# Patient Record
Sex: FEMALE
[De-identification: names, ages, dates, MRNs, and addresses within clinical notes are randomized per-mention and may not be internally consistent; named-entity substitution may affect disease eponyms.]

---

## 2022-08-17 ENCOUNTER — HOSPITAL ENCOUNTER (OUTPATIENT)
Dept: HOSPITAL 75 - RAD | Age: 22
End: 2022-08-17
Attending: NURSE PRACTITIONER
Payer: COMMERCIAL

## 2022-08-17 DIAGNOSIS — R76.11: Primary | ICD-10-CM

## 2022-08-17 PROCEDURE — 71045 X-RAY EXAM CHEST 1 VIEW: CPT

## 2022-08-17 NOTE — DIAGNOSTIC IMAGING REPORT
INDICATION: Positive TB skin test.



EXAMINATION: Single view chest, 08/17/2022.



FINDINGS: The cardiomediastinal silhouette is unremarkable. The

pulmonary vasculature is within normal limits.



The lungs and pleural spaces are clear.



IMPRESSION: No evidence of an acute cardiopulmonary process. 



Dictated by: 



  Dictated on workstation # [id number]

## 2023-01-13 ENCOUNTER — HOSPITAL ENCOUNTER (EMERGENCY)
Dept: HOSPITAL 75 - ER | Age: 23
Discharge: HOME | End: 2023-01-13
Payer: SELF-PAY

## 2023-01-13 VITALS — BODY MASS INDEX: 21.64 KG/M2 | HEIGHT: 59.84 IN | WEIGHT: 110.23 LBS

## 2023-01-13 VITALS — SYSTOLIC BLOOD PRESSURE: 106 MMHG | DIASTOLIC BLOOD PRESSURE: 85 MMHG

## 2023-01-13 DIAGNOSIS — J02.9: Primary | ICD-10-CM

## 2023-01-13 PROCEDURE — 99283 EMERGENCY DEPT VISIT LOW MDM: CPT

## 2023-01-13 PROCEDURE — 87430 STREP A AG IA: CPT

## 2023-01-13 NOTE — ED EENT
History of Present Illness


General


Chief Complaint:  Oral/Throat Problems


Stated Complaint:  THROAT PAIN


Nursing Triage Note:  


sore throat, subjective fever today.


Source:  patient





History of Present Illness


Date Seen by Provider:  Jan 13, 2023


Time Seen by Provider:  22:40


Initial Comments


PT ARRIVES VIA POV


C/O SORE THROAT THAT BEGAN THIS AFTERNOON


C/O SUBJECTIVE FEVER


C/O FRONTAL HEADACHE


NO URI SYMPTOMS OR COUGH


NO BODY ACHES


NO GI SYMPTOMS





HAS A FRIEND WHO HAS BEEN SICK WITH THE SAME FOR THE LAST 2-3 DAYS, AND SHE HAS 

BEEN WITH HIM THE LAST 2 DAYS. HE HAS NOT SEEN  





TOOK UNKNOWN MEDICATION THIS AFTERNOON FOR SYMPTOMS





NO CHRONIC MEDICAL PROBLEMS





PT IS [facility]





Allergies and Home Medications


Allergies


Coded Allergies:  


     No Known Drug Allergies (Unverified , 1/13/23)





Patient Home Medication List


Home Medication List Reviewed:  Yes


Amoxicillin (Amoxicillin) 875 Mg Tablet, 875 MG PO BID


   Prescribed by: LUIS BALLARD on 1/13/23 4929





Review of Systems


Review of Systems


Constitutional:  see HPI


Eyes:  No Symptoms Reported


Ears:  No Symptoms Reported


Nose:  no symptoms reported


Mouth:  no symptoms reported


Throat:  see HPI, pain; denies hoarse, denies muffled; painful swallowing; 

denies difficulty with fluids


Respiratory:  no symptoms reported


Cardiovascular:  no symptoms reported


Gastrointestinal:  no symptoms reported


Pregnant:  No


Musculoskeletal:  no symptoms reported


Skin:  no symptoms reported


Neurological:  See HPI, Headache


Hematologic/Lymphatic:  No Symptoms Reported


Immunological/Allergic:  no symptoms reported





Past Medical-Social-Family Hx


Patient Social History


Tobacco Use?:  No


Substance use?:  No


Alcohol Use?:  No


Pt feels they are or have been:  No





Immunizations Up To Date


First/Initial COVID19 Vaccinat:  x2





Past Medical History


Surgery/Hospitalization HX:  


tonsillitis


Surgeries:  No


Respiratory:  No


Cardiac:  No


Neurological:  No


Genitourinary:  No


Gastrointestinal:  No


Musculoskeletal:  No


Endocrine:  No


HEENT:  No


Cancer:  No


Psychosocial:  No


Integumentary:  No


Blood Disorders:  No





Physical Exam


Vital Signs





Vital Signs - First Documented








 1/13/23





 22:34


 


Temp 35.9


 


Pulse 80


 


Resp 16


 


B/P (MAP) 106/85 (92)


 


Pulse Ox 100


 


O2 Delivery Room Air








Height, Weight, BMI


Height: '"


Weight: lbs. oz. kg; 21.00 BMI


Method:


General Appearance:  WD/WN, no apparent distress, other (DOES NOT APPEAR ILL OR 

TO BE IN ANY DISCOMFORT OR DISTRESS. TALKATIVE, SMILING. )


Eyes:  bilateral eye normal inspection


Ears:  bilateral ear auricle normal, bilateral ear canal normal, bilateral ear 

TM normal


Nose:  normal inspection


Mouth/Throat:  No trismus, No uvula swelling, No voice changes; other (ERYTHEMA 

TO TONSILS AND UVULA. TONSILS NOT SIGNIFICANTLY SWOLLEN. NO EVIDENCE OF ABSCESS.

NO DIFFICULTY HANDLING SECRETIONS. NO EXUDATES NOTED. )


Neck:  non-tender, full range of motion, supple, normal inspection; No 

lymphadenopathy (R), No lymphadenopathy (L)


Cardiovascular:  regular rate, rhythm, no murmur


Respiratory:  normal breath sounds, no respiratory distress, no accessory muscle

use


Gastrointestinal:  non tender, soft


Neurologic/Psychiatric:  CNs II-XII nml as tested, no motor/sensory deficits, 

alert, normal mood/affect, oriented x 3


Skin:  normal color (DARK SKINNED), warm/dry; No rash





Progress/Results/Core Measures


Results/Orders


Lab Results





Laboratory Tests








Test


 1/13/23


22:39 Range/Units


 


 


Group A Streptococcus Screen NEGATIVE  NEGATIVE  








My Orders





Orders - LUIS BALLARD DO


Rapid Strep A Screen (1/13/23 22:39)


Rx-Amoxicillin Capsule (Rx-Polymox Capsu (1/13/23 22:52)





Vital Signs/I&O











 1/13/23





 22:34


 


Temp 35.9


 


Pulse 80


 


Resp 16


 


B/P (MAP) 106/85 (92)


 


Pulse Ox 100


 


O2 Delivery Room Air














Blood Pressure Mean:                    92











Progress


Progress Note :  


Progress Note





STREP TESTING DONE, AND IS NEGATIVE


WILL TREAT WITH ANTIBIOTICS AT THIS TIME. 





REVIEWED TEST RESULTS, ANTICIPATED COURSE, SYMPTOMATIC TREATMENT, MEDICATIONS, 

NEED FOR FOLLOW UP AND RETURN PRECAUTIONS





Departure


Impression





   Primary Impression:  


   Pharyngitis


Disposition:  01 HOME, SELF-CARE


Condition:  Stable





Departure-Patient Inst.


Decision time for Depature:  22:53


Referrals:  


NO,LOCAL PHYSICIAN (PCP)


Primary Care Physician








SAAD HEWITT MD


Patient Instructions:  Sore Throat, Adult ED





Add. Discharge Instructions:  


LOTS OF CLEAR LIQUIDS--WATER, BROTH, JELLO, GATORADE





TYLENOL ( ACETAMINOPHEN) 1 GRAM EVERY 6 HOURS


MOTRIN ( IBUPROFEN ) 800 MG EVERY 6 HOURS


AS NEEDED FOR PAIN OR FEVER





OVER THE COUNTER CEPACOL OR OTHER THROAT LOZENGES FOR THROAT PAIN





FREQUENT SALT WATER GARGLES





FOLLOW  UP WITH [facility] IN 3-4 DAYS IF NO BETTER. 





All discharge instructions reviewed with patient and/or family. Voiced 

understanding.


Scripts


Amoxicillin (Amoxicillin) 875 Mg Tablet


875 MG PO BID, #20 TAB


   Prov: LUIS BALLARD DO         1/13/23











LUIS BALLARD DO                 Jan 13, 2023 22:52